# Patient Record
(demographics unavailable — no encounter records)

---

## 2025-06-09 NOTE — ASSESSMENT
[FreeTextEntry1] : #Seborrheic dermatitis, scalp chronic, flaring -I discussed the chronic nature and course of this condition -Start Ketoconazole 2% shampoo 2-3 times a week to the scalp. Leave on for at least 5 mins before rinsing out. Potential SE reviewed including dryness, irritation. Alternate with Vichy Dercos or Neutrogena T-sal shampoo. -Start clobetasol solution daily to AA until improved then as needed, SED, do not get on face.  #Multiple benign nevi - chronic, stable - I discussed the chronic nature and course of the condition - Photoprotection discussed, recommend daily broad-spectrum sunscreen, SPF 30 or greater, UPF hat, clothing. - Pt educated on ABCDE of melanoma - Recommend self-skin exam and annual skin exam by MD - Pt instructed to return for new or changing lesions especially if any moles start to change, itch, or bleed   #Seborrheic keratosis #Cherry angioma - chronic, stable -I discussed the chronic nature and course of the condition -counseled on benign nature  RTC 1 year for TBSE, sooner PRN

## 2025-06-09 NOTE — HISTORY OF PRESENT ILLNESS
[de-identified] : 50 y/o female presents for skin check Concerns today: No new, changing, growing, bleeding, concerning skin lesions noticed itchy spot on the scalp  Sunscreen use: yes  Hx of blistering sun burns: yes, as a child Hx of tanning bed use: yes Personal history of skin cancer: no Family history of skin cancer: father, BCC on nose no FH of melanoma   EM physician  [FreeTextEntry1] : skin check

## 2025-06-09 NOTE — PHYSICAL EXAM
[Alert] : alert [Oriented x 3] : ~L oriented x 3 [FreeTextEntry3] : PE:   General: well-appearing, alert, in no acute distress Full body skin exam performed examining scalp, head, face, ears, eyes, mouth, neck, chest, back, abdomen, axilla, b/l arms, b/l forearms, b/l hands, b/l fingernails, b/l thighs, b/l legs, b/l feet, b/l toenails, groin, buttocks Pertinent findings include: -scattered light brown to dark brown colored <6mm papules and macules on the trunk and extremities -brown stuck on papules, plaques on the trunk and extremities -scaly patches posterior scalp